# Patient Record
Sex: MALE | Race: WHITE | NOT HISPANIC OR LATINO
[De-identification: names, ages, dates, MRNs, and addresses within clinical notes are randomized per-mention and may not be internally consistent; named-entity substitution may affect disease eponyms.]

---

## 2019-06-10 PROBLEM — Z00.00 ENCOUNTER FOR PREVENTIVE HEALTH EXAMINATION: Status: ACTIVE | Noted: 2019-06-10

## 2019-06-13 ENCOUNTER — APPOINTMENT (OUTPATIENT)
Dept: PHYSICAL MEDICINE AND REHAB | Facility: CLINIC | Age: 64
End: 2019-06-13
Payer: COMMERCIAL

## 2019-06-13 VITALS — BODY MASS INDEX: 25.73 KG/M2 | WEIGHT: 190 LBS | HEIGHT: 72 IN

## 2019-06-13 DIAGNOSIS — M17.12 UNILATERAL PRIMARY OSTEOARTHRITIS, LEFT KNEE: ICD-10-CM

## 2019-06-13 PROCEDURE — 20611 DRAIN/INJ JOINT/BURSA W/US: CPT | Mod: LT

## 2019-06-13 PROCEDURE — 99214 OFFICE O/P EST MOD 30 MIN: CPT | Mod: 25

## 2019-06-13 PROCEDURE — 73562 X-RAY EXAM OF KNEE 3: CPT | Mod: LT

## 2019-06-13 RX ORDER — LORATADINE 5 MG/5 ML
SOLUTION, ORAL ORAL
Refills: 0 | Status: ACTIVE | COMMUNITY

## 2019-06-13 RX ORDER — LOSARTAN POTASSIUM 50 MG/1
50 TABLET, FILM COATED ORAL
Refills: 0 | Status: ACTIVE | COMMUNITY

## 2019-06-13 NOTE — ASSESSMENT
[FreeTextEntry1] : Needs better exercises.  Taught lunge.  Corrected squat.  Has PT rx.  Change shoes every 300 miles. \par \par Do not recommend another PRP inj. Discussed Orthovisc.

## 2019-06-13 NOTE — DATA REVIEWED
[Plain X-Rays] : plain X-Rays [FreeTextEntry1] : left knee xray:\par \par mild/moderate medial knee OA

## 2019-06-13 NOTE — PROCEDURE
[de-identified] : Ultrasound Guided left Knee Steroid Injection\par \par After informed consent, he was placed in a seated position.  Injection site was localized using anatomical landmarks.  The skin was sterilely prepped.  Using routine sterile technique and an anteromedial approach, a steroid solution consisting of 20 mg of Kenalog and 2 ml of 1% Lidocaine was instilled using ultrasound guidance.  There were no complications and a bandage was applied.  He tolerated the procedure well and was given post-injection instructions.  Rec: Cold therapy, analgesics, avoid heavy activity.\par \par

## 2019-06-13 NOTE — PHYSICAL EXAM
[FreeTextEntry1] : 64 year yo male in NAD and normal body habitus\par \par \par Gait - normal\par no swelling, erythema, warmth\par flexion to 120 deg pain\par no TTP in patellar and quad tendon, medial/lateral joint\par 4+/5 knee ext\par neg Lachman, Fiona, valgus/varus laxity\par

## 2019-06-13 NOTE — HISTORY OF PRESENT ILLNESS
[FreeTextEntry1] : Location: left knee\par Quality: ache\par Severity: 8/10\par Duration: 6 months\par Timing: chronic\par Context: atraumatic\par Aggravating Factors: running, sit to stand\par Alleviating Factors: stretching, meds\par Associated Symptoms: denies weight loss, fever, chills, change in bowel/bladder habits, redness, warmth, weakness\par Prior Studies:\par \par had PRP in [place] in Fall 2018

## 2019-11-26 ENCOUNTER — APPOINTMENT (OUTPATIENT)
Dept: ORTHOPEDIC SURGERY | Facility: CLINIC | Age: 64
End: 2019-11-26
Payer: COMMERCIAL

## 2019-11-26 VITALS — WEIGHT: 185 LBS | BODY MASS INDEX: 25.06 KG/M2 | HEIGHT: 72 IN

## 2019-11-26 DIAGNOSIS — M54.5 LOW BACK PAIN: ICD-10-CM

## 2019-11-26 PROCEDURE — 72100 X-RAY EXAM L-S SPINE 2/3 VWS: CPT

## 2019-11-26 PROCEDURE — 99203 OFFICE O/P NEW LOW 30 MIN: CPT

## 2019-11-26 RX ORDER — CYCLOBENZAPRINE HYDROCHLORIDE 5 MG/1
5 TABLET, FILM COATED ORAL 3 TIMES DAILY
Qty: 15 | Refills: 1 | Status: ACTIVE | COMMUNITY
Start: 2019-11-26 | End: 1900-01-01

## 2019-11-26 RX ORDER — DICLOFENAC SODIUM 75 MG/1
75 TABLET, DELAYED RELEASE ORAL
Qty: 60 | Refills: 0 | Status: ACTIVE | COMMUNITY
Start: 2019-11-26 | End: 1900-01-01

## 2019-11-26 NOTE — ASSESSMENT
[FreeTextEntry1] : 64 year old male with acute on chronic low back pain for 10 days.  The pain has improved.  He has no radicular symptoms and is neurologically intact.  He was given a prescription for diclofenac.  He was given a referral for physical therapy.  He was given a prescription for cyclobenzaprine. He will followup in 3 months. He knows to call with any questions or concerns or if his symptoms acutely worsen.

## 2019-11-26 NOTE — PHYSICAL EXAM
[de-identified] : General: No acute distress, conversant, well-nourished.\par Head: Normocephalic, atraumatic\par Neck: trachea midline, FROM\par Heart: normotensive and normal rate and rhythm\par Lungs: No labored breathing\par Skin: No abrasions, no rashes, no edema\par Psych: Alert and oriented to person, place and time\par Extremities: no peripheral edema or digital cyanosis\par Gait: Normal gait. Can perform tandem gait.  \par Vascular: warm and well perfused distally, palpable distal pulses\par \par MSK:\par Lumbar spine:\par Alignment normal.\par Tenderness to palpation in low back.  No step-off, no deformity.\par \par NEURO EXAM:\par Sensation \par Left L2  -  2/2            \par Left L3  -  2/2\par Left L4  -  2/2\par Left L5  -  2/2\par Left S1  -  2/2\par \par Right L2  -  2/2            \par Right L3  -  2/2\par Right L4  -  2/2\par Right L5  -  2/2\par Right S1  -  2/2\par \par Motor: \par Left L2 (hip flexion)                            5/5                \par Left L3 (knee extension)                   5/5                \par Left L4 (ankle dosiflexion)                 5/5                \par Left L5 (long toe extensor)                5/5                \par Left S1 (ankle plantar flexion)           5/5\par \par Right L2 (hip flexion)                            5/5                \par Right L3 (knee extension)                   5/5                \par Right L4 (ankle dosiflexion)                 5/5                \par Right L5 (long toe extensor)                5/5                \par Right S1 (ankle plantar flexion)           5/5\par \par Reflexes: Normal and symmetric\par Negative straight leg raise bilaterally\par Negative clonus.  Down-going Babinski.  \par \par  [de-identified] : Lumbar spine radiographs obtained in the office today shows no fracture or dislocation. There is lumbar spondylosis with mild loss of disc height at L5-S1.

## 2019-11-26 NOTE — HISTORY OF PRESENT ILLNESS
[de-identified] : 64 year old male with acute on chronic back pain.  He says this latest episode happened 10 days ago with no inciting event.  He says he normally handles his chronic back pain with core exercises he does at home.  However this new episode is significantly more painful.  He denies any radicular pain, numbness, paresthesias, weakness, urinary retention or balance problems.  He says he took a few corticosteroid pills at home which helped.